# Patient Record
Sex: MALE | Race: WHITE | Employment: UNEMPLOYED | URBAN - METROPOLITAN AREA
[De-identification: names, ages, dates, MRNs, and addresses within clinical notes are randomized per-mention and may not be internally consistent; named-entity substitution may affect disease eponyms.]

---
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
## 2025-03-17 ENCOUNTER — HOSPITAL ENCOUNTER (EMERGENCY)
Facility: CLINIC | Age: 20
Discharge: HOME | End: 2025-03-17
Attending: NURSE PRACTITIONER | Admitting: EMERGENCY MEDICINE
Payer: COMMERCIAL

## 2025-03-17 ENCOUNTER — APPOINTMENT (OUTPATIENT)
Dept: GENERAL RADIOLOGY | Facility: CLINIC | Age: 20
End: 2025-03-17
Attending: NURSE PRACTITIONER
Payer: COMMERCIAL

## 2025-03-17 VITALS
DIASTOLIC BLOOD PRESSURE: 68 MMHG | WEIGHT: 207 LBS | TEMPERATURE: 98.4 F | SYSTOLIC BLOOD PRESSURE: 129 MMHG | HEART RATE: 66 BPM | HEIGHT: 73 IN | OXYGEN SATURATION: 98 % | BODY MASS INDEX: 27.43 KG/M2 | RESPIRATION RATE: 11 BRPM

## 2025-03-17 DIAGNOSIS — K59.00 CONSTIPATION, UNSPECIFIED CONSTIPATION TYPE: ICD-10-CM

## 2025-03-17 DIAGNOSIS — R55 NEAR SYNCOPE: ICD-10-CM

## 2025-03-17 LAB
ALBUMIN SERPL BCG-MCNC: 4.6 G/DL (ref 3.5–5.2)
ALP SERPL-CCNC: 53 U/L (ref 65–260)
ALT SERPL W P-5'-P-CCNC: 16 U/L (ref 0–50)
ANION GAP SERPL CALCULATED.3IONS-SCNC: 10 MMOL/L (ref 7–15)
AST SERPL W P-5'-P-CCNC: 23 U/L (ref 0–35)
BASOPHILS # BLD AUTO: 0 10E3/UL (ref 0–0.2)
BASOPHILS NFR BLD AUTO: 0 %
BILIRUB SERPL-MCNC: 0.5 MG/DL
BUN SERPL-MCNC: 10.1 MG/DL (ref 6–20)
CALCIUM SERPL-MCNC: 9.7 MG/DL (ref 8.8–10.4)
CHLORIDE SERPL-SCNC: 102 MMOL/L (ref 98–107)
CREAT SERPL-MCNC: 0.97 MG/DL (ref 0.67–1.17)
EGFRCR SERPLBLD CKD-EPI 2021: >90 ML/MIN/1.73M2
EOSINOPHIL # BLD AUTO: 0.1 10E3/UL (ref 0–0.7)
EOSINOPHIL NFR BLD AUTO: 2 %
ERYTHROCYTE [DISTWIDTH] IN BLOOD BY AUTOMATED COUNT: 13.1 % (ref 10–15)
GLUCOSE BLDC GLUCOMTR-MCNC: 82 MG/DL (ref 70–99)
GLUCOSE SERPL-MCNC: 88 MG/DL (ref 70–99)
HCO3 SERPL-SCNC: 27 MMOL/L (ref 22–29)
HCT VFR BLD AUTO: 45.8 % (ref 40–53)
HGB BLD-MCNC: 15.4 G/DL (ref 13.3–17.7)
HOLD SPECIMEN: NORMAL
IMM GRANULOCYTES # BLD: 0 10E3/UL
IMM GRANULOCYTES NFR BLD: 0 %
LYMPHOCYTES # BLD AUTO: 3.4 10E3/UL (ref 0.8–5.3)
LYMPHOCYTES NFR BLD AUTO: 41 %
MCH RBC QN AUTO: 29.9 PG (ref 26.5–33)
MCHC RBC AUTO-ENTMCNC: 33.6 G/DL (ref 31.5–36.5)
MCV RBC AUTO: 89 FL (ref 78–100)
MONOCYTES # BLD AUTO: 0.6 10E3/UL (ref 0–1.3)
MONOCYTES NFR BLD AUTO: 8 %
NEUTROPHILS # BLD AUTO: 4.1 10E3/UL (ref 1.6–8.3)
NEUTROPHILS NFR BLD AUTO: 49 %
NRBC # BLD AUTO: 0 10E3/UL
NRBC BLD AUTO-RTO: 0 /100
PLAT MORPH BLD: ABNORMAL
PLATELET # BLD AUTO: 270 10E3/UL (ref 150–450)
POTASSIUM SERPL-SCNC: 4 MMOL/L (ref 3.4–5.3)
PROT SERPL-MCNC: 8.3 G/DL (ref 6.4–8.3)
RBC # BLD AUTO: 5.15 10E6/UL (ref 4.4–5.9)
RBC MORPH BLD: ABNORMAL
SODIUM SERPL-SCNC: 139 MMOL/L (ref 135–145)
VARIANT LYMPHS BLD QL SMEAR: PRESENT
WBC # BLD AUTO: 8.3 10E3/UL (ref 4–11)

## 2025-03-17 PROCEDURE — 93005 ELECTROCARDIOGRAM TRACING: CPT | Performed by: EMERGENCY MEDICINE

## 2025-03-17 PROCEDURE — 82962 GLUCOSE BLOOD TEST: CPT

## 2025-03-17 PROCEDURE — 74019 RADEX ABDOMEN 2 VIEWS: CPT

## 2025-03-17 PROCEDURE — 80053 COMPREHEN METABOLIC PANEL: CPT | Performed by: EMERGENCY MEDICINE

## 2025-03-17 PROCEDURE — 82565 ASSAY OF CREATININE: CPT | Performed by: EMERGENCY MEDICINE

## 2025-03-17 PROCEDURE — 99284 EMERGENCY DEPT VISIT MOD MDM: CPT | Performed by: EMERGENCY MEDICINE

## 2025-03-17 PROCEDURE — 85004 AUTOMATED DIFF WBC COUNT: CPT | Performed by: EMERGENCY MEDICINE

## 2025-03-17 PROCEDURE — 99285 EMERGENCY DEPT VISIT HI MDM: CPT | Performed by: EMERGENCY MEDICINE

## 2025-03-17 PROCEDURE — 36415 COLL VENOUS BLD VENIPUNCTURE: CPT | Performed by: EMERGENCY MEDICINE

## 2025-03-17 PROCEDURE — 85041 AUTOMATED RBC COUNT: CPT | Performed by: EMERGENCY MEDICINE

## 2025-03-17 PROCEDURE — 84155 ASSAY OF PROTEIN SERUM: CPT | Performed by: EMERGENCY MEDICINE

## 2025-03-17 RX ORDER — POLYETHYLENE GLYCOL 3350 17 G/17G
1 POWDER, FOR SOLUTION ORAL DAILY
Qty: 578 G | Refills: 3 | Status: SHIPPED | OUTPATIENT
Start: 2025-03-17

## 2025-03-17 RX ORDER — MAGNESIUM CARB/ALUMINUM HYDROX 105-160MG
296 TABLET,CHEWABLE ORAL ONCE
Qty: 296 ML | Refills: 0 | Status: SHIPPED | OUTPATIENT
Start: 2025-03-17 | End: 2025-03-17

## 2025-03-17 ASSESSMENT — COLUMBIA-SUICIDE SEVERITY RATING SCALE - C-SSRS
6. HAVE YOU EVER DONE ANYTHING, STARTED TO DO ANYTHING, OR PREPARED TO DO ANYTHING TO END YOUR LIFE?: NO
1. IN THE PAST MONTH, HAVE YOU WISHED YOU WERE DEAD OR WISHED YOU COULD GO TO SLEEP AND NOT WAKE UP?: NO
2. HAVE YOU ACTUALLY HAD ANY THOUGHTS OF KILLING YOURSELF IN THE PAST MONTH?: NO

## 2025-03-17 ASSESSMENT — ACTIVITIES OF DAILY LIVING (ADL)
ADLS_ACUITY_SCORE: 44
ADLS_ACUITY_SCORE: 44

## 2025-03-17 NOTE — ED PROVIDER NOTES
History     Chief Complaint   Patient presents with    Constipation     HPI  Orion Deluna is a 19 year old male who presents for concerns of constipation.  The patient reports that over the past 2 months he has been increasingly constipated, he has been dealing with these types of symptoms off and on most of his life but this is gotten worse since her recent change in his job.  He feels bloated and says he has a bowel movement every other day or so, sometimes less often, describes it is very small amounts of rapid turn like pellets.  More recently he has had some soft stringy stool been very small amounts.  He has to strain to push it out.  No significant abdominal pain.  No rectal pain.  He did try MiraLAX 1 day and this did seem to help for that day but then he stopped taking it and he seems like he is worse again.  No fevers, vomiting, or nausea.    The patient initially went to urgent care and was being evaluated when he suddenly felt lightheaded and diaphoretic.  He says he developed ringing in his ears with some mild nausea and felt lightheaded and like everything was going dark.  This all then resolved fairly quickly and he was sent here for further evaluation.    Allergies:  No Known Allergies    Problem List:    Patient Active Problem List    Diagnosis Date Noted    Diabetes mellitus, type 2 (H) 10/31/2022     Priority: Medium    Fecal impaction (H) 02/21/2017     Priority: Medium    Sensorineural hearing loss, bilateral 03/18/2014     Priority: Medium     389.18        Obesity 08/31/2011     Priority: Medium        Past Medical History:    Past Medical History:   Diagnosis Date    Obesity 8/31/2011       Past Surgical History:    No past surgical history on file.    Family History:    Family History   Problem Relation Age of Onset    Obesity Mother     Thyroid Disease Maternal Grandfather     Diabetes Maternal Grandfather     Obesity Maternal Grandfather        Social History:  Marital Status:   "Single [1]  Social History     Tobacco Use    Smoking status: Passive Smoke Exposure - Never Smoker    Smokeless tobacco: Never    Tobacco comments:     in the house   Substance Use Topics    Alcohol use: No    Drug use: No        Medications:    ACCU-CHEK GUIDE test strip  Alcohol Swabs PADS  blood glucose monitoring (SOFTCLIX) lancets  Glucagon (GVOKE HYPOPEN 2-PACK) 1 MG/0.2ML SOAJ  glucose (BD GLUCOSE) 4 g chewable tablet  insulin aspart (NOVOLOG PEN) 100 UNIT/ML pen  insulin glargine (LANTUS PEN) 100 UNIT/ML pen  insulin pen needle (32G X 4 MM) 32G X 4 MM miscellaneous  magnesium citrate 1.745 GM/30ML solution  polyethylene glycol (MIRALAX) 17 GM/Dose powder  Sharps Container MISC          Review of Systems    Physical Exam   BP: 137/89  Pulse: 113  Temp: 98.4  F (36.9  C)  Resp: 18  Height: 185.4 cm (6' 1\")  Weight: 93.9 kg (207 lb)  SpO2: 97 %      Physical Exam  Constitutional:       General: He is not in acute distress.     Appearance: He is well-developed.   HENT:      Head: Normocephalic and atraumatic.   Cardiovascular:      Rate and Rhythm: Normal rate.      Heart sounds: No murmur heard.  Pulmonary:      Effort: No respiratory distress.      Breath sounds: No stridor.   Abdominal:      General: There is no distension.      Palpations: Abdomen is soft.      Tenderness: There is no abdominal tenderness. There is no guarding or rebound.   Skin:     General: Skin is warm and dry.   Neurological:      Mental Status: He is alert.         ED Course        Procedures              EKG Interpretation:      Interpreted by Ben Álvarez MD  Time reviewed: 1620  Symptoms at time of EKG: near syncope   Rhythm: normal sinus   Rate: normal  Axis: normal  Ectopy: none  Conduction: normal  ST Segments/ T Waves: No ST-T wave changes  Q Waves: none  Comparison to prior: No old EKG available    Clinical Impression: normal EKG      Critical Care time:  none     None         Results for orders placed or performed " during the hospital encounter of 03/17/25 (from the past 24 hours)   Abdomen, flat/upright (2 views)    Narrative    EXAM: XR ABDOMEN 2 VIEWS  LOCATION: [facility]  DATE: 3/17/2025    INDICATION: Constipation with difficulties having stool  COMPARISON: Abdominal radiograph 2005.      Impression    IMPRESSION:     No evidence of bowel obstruction. No pneumatosis or free intraperitoneal air.    Moderate amount of stool within the ascending colon. Small amount of stool scattered throughout the remainder of the colon.    Visualized lung bases are clear.   Glucose by meter   Result Value Ref Range    GLUCOSE BY METER POCT 82 70 - 99 mg/dL   [place] Draw    Narrative    The following orders were created for panel order [place] Draw.  Procedure                               Abnormality         Status                     ---------                               -----------         ------                     Extra Blue Top Tube[[id number]]                             Final result               Extra Red Top Tube[[id number]]                              Final result               Extra Green Top (Lithiu...[[id number]]                      Final result               Extra Purple Top Tube[[id number]]                           Final result                 Please view results for these tests on the individual orders.   Extra Blue Top Tube   Result Value Ref Range    Hold Specimen JIC    Extra Red Top Tube   Result Value Ref Range    Hold Specimen JIC    Extra Green Top (Lithium Heparin) Tube   Result Value Ref Range    Hold Specimen     Extra Purple Top Tube   Result Value Ref Range    Hold Specimen JIC    CBC with platelets, differential    Narrative    The following orders were created for panel order CBC with platelets, differential.  Procedure                               Abnormality         Status                     ---------                               -----------         ------                      CBC with platelets and ...[[id number]]                      Final result               RBC and Platelet Morpho...[[id number]]  Abnormal            Final result                 Please view results for these tests on the individual orders.   Comprehensive metabolic panel   Result Value Ref Range    Sodium 139 135 - 145 mmol/L    Potassium 4.0 3.4 - 5.3 mmol/L    Carbon Dioxide (CO2) 27 22 - 29 mmol/L    Anion Gap 10 7 - 15 mmol/L    Urea Nitrogen 10.1 6.0 - 20.0 mg/dL    Creatinine 0.97 0.67 - 1.17 mg/dL    GFR Estimate >90 >60 mL/min/1.73m2    Calcium 9.7 8.8 - 10.4 mg/dL    Chloride 102 98 - 107 mmol/L    Glucose 88 70 - 99 mg/dL    Alkaline Phosphatase 53 (L) 65 - 260 U/L    AST 23 0 - 35 U/L    ALT 16 0 - 50 U/L    Protein Total 8.3 6.4 - 8.3 g/dL    Albumin 4.6 3.5 - 5.2 g/dL    Bilirubin Total 0.5 <=1.2 mg/dL   CBC with platelets and differential   Result Value Ref Range    WBC Count 8.3 4.0 - 11.0 10e3/uL    RBC Count 5.15 4.40 - 5.90 10e6/uL    Hemoglobin 15.4 13.3 - 17.7 g/dL    Hematocrit 45.8 40.0 - 53.0 %    MCV 89 78 - 100 fL    MCH 29.9 26.5 - 33.0 pg    MCHC 33.6 31.5 - 36.5 g/dL    RDW 13.1 10.0 - 15.0 %    Platelet Count 270 150 - 450 10e3/uL    % Neutrophils 49 %    % Lymphocytes 41 %    % Monocytes 8 %    % Eosinophils 2 %    % Basophils 0 %    % Immature Granulocytes 0 %    NRBCs per 100 WBC 0 <1 /100    Absolute Neutrophils 4.1 1.6 - 8.3 10e3/uL    Absolute Lymphocytes 3.4 0.8 - 5.3 10e3/uL    Absolute Monocytes 0.6 0.0 - 1.3 10e3/uL    Absolute Eosinophils 0.1 0.0 - 0.7 10e3/uL    Absolute Basophils 0.0 0.0 - 0.2 10e3/uL    Absolute Immature Granulocytes 0.0 <=0.4 10e3/uL    Absolute NRBCs 0.0 10e3/uL   RBC and Platelet Morphology   Result Value Ref Range    RBC Morphology Confirmed RBC Indices     Platelet Assessment  Automated Count Confirmed. Platelet morphology is normal.     Automated Count Confirmed. Platelet morphology is normal.    Reactive Lymphocytes Present (A) None Seen    EKG 12-lead, tracing only   Result Value Ref Range    Systolic Blood Pressure  mmHg    Diastolic Blood Pressure  mmHg    Ventricular Rate 70 BPM    Atrial Rate 70 BPM    AK Interval 146 ms    QRS Duration 88 ms     ms    QTc 403 ms    P Axis 58 degrees    R AXIS 86 degrees    T Axis 43 degrees    Interpretation ECG       Sinus rhythm  Normal ECG  No previous ECGs available         Medications - No data to display    Assessments & Plan (with Medical Decision Making)   19-year-old male who presents for abdominal pain, was seen initially in urgent care and sent here after he had what sounds like a near syncopal episode while being evaluated.  He denies any complaints currently except for the bloating of his abdomen.  Abdominal exam is benign and not concerning for an acute surgical process such as obstruction.  He did have an x-ray of his abdomen obtained while he was over in urgent care, IV reviewed these images and interpreted them myself, no signs of free air or obstruction.  His glucose is normal.  EKG is sinus rhythm without signs of ischemia or dysrhythmia such as WPW, Brugada syndrome, prolonged QT syndrome, or arrhythmogenic right ventricular dysplasia.  No indication for further workup of his near syncopal episode, probable vasovagal episode.  He is safe to discharge with reassurance and instructions to drink plenty of fluids, increase the fiber in his diet, use a bottle of magnesium citrate tonight and then take MiraLAX daily to help have a normal bowel movement daily.  He is told to return if worse, otherwise follow-up in clinic.  The patient is in agreement with this plan.    I have reviewed the nursing notes.    I have reviewed the findings, diagnosis, plan and need for follow up with the patient.           Medical Decision Making  The patient's presentation was of low complexity (an acute and uncomplicated illness or injury).    The patient's evaluation involved:  ordering and/or review of 3+  test(s) in this encounter (see separate area of note for details)  independent interpretation of testing performed by another health professional (see separate area of note for details)    The patient's management necessitated only low risk treatment.        Discharge Medication List as of 3/17/2025  5:06 PM        START taking these medications    Details   magnesium citrate 1.745 GM/30ML solution Take 296 mLs by mouth once for 1 dose., Disp-296 mL, R-0, E-Prescribe      polyethylene glycol (MIRALAX) 17 GM/Dose powder Take 17 g (1 Capful) by mouth daily., Disp-578 g, R-3, E-Prescribe             Final diagnoses:   Constipation, unspecified constipation type   Near syncope       3/17/2025   [facility] EMERGENCY DEPT       Ben Álvarez MD  03/17/25 2518

## 2025-03-17 NOTE — ED TRIAGE NOTES
Patient was being seen in , during exam, provider was palpating his abdomen. Patient became dizzy, diaphoretic and blurred vision. Patient BS 81. Alert and oriented upon arrival from  to ED.      Triage Assessment (Adult)       Row Name 03/17/25 5844          Triage Assessment    Airway WDL WDL        Respiratory WDL    Respiratory WDL WDL        Skin Circulation/Temperature WDL    Skin Circulation/Temperature WDL X;temperature;circulation     Skin Circulation pallor     Skin Temperature warm        Cardiac WDL    Cardiac WDL WDL        Peripheral/Neurovascular WDL    Peripheral Neurovascular WDL WDL        Cognitive/Neuro/Behavioral WDL    Cognitive/Neuro/Behavioral WDL WDL

## 2025-03-17 NOTE — DISCHARGE INSTRUCTIONS
Drink plenty of water.  Make sure that you are eating plenty of fiber in your diet with foods such as leafy greens (broccoli, spinach, kale) and whole grains instead of white bread. Drink the bottle of magnesium citrate tonight.    Take the Miralax powder to help make your stools softer.  Start with 1-2 capful mixed in 6-8 oz of any liquid once a day.  If that does not help him have softer stools, try increasing it to 3-4 capful in 12-16 oz once a day or 1-2 capful in 6-8 oz twice a day.  You can increase or decrease the amount as needed to achieve one to two soft stools per day.

## 2025-03-17 NOTE — ED PROVIDER NOTES
ED Provider Note  [facility]      History   No chief complaint on file.    HPI  Orion Deluna is a 19 year old male who was brought to urgent care for evaluation of constipation patient reports that he does not have abdominal pain, that he has not had melena or dark-colored stools no obvious blood in his stool.  Denies any distention of his abdomen.  Able to have a stool this morning.  Past medical history of type 2 diabetes, obesity and admission for type 2 diabetes in 2022.  Patient reports that he has changed his lifestyle,  his diet is improved and he lost a significant amount of weight.  Has not been seen primary care for more than 2 years.  Previously was taking insulin and followed by an endocrinologist.  Last notes seen 2022.    After returning from x-ray, reports feeling mildly nauseated, reports pain directly under his ribs on both sides, new onset of decreased visual acuity, and reported feeling lightheaded, with noticeable diaphoresis.  Blood sugar taken in urgent care results 82mg/dL.  Patient was transferred to the emergency department for further evaluation and treatment.            Allergies:  No Known Allergies    Problem List:    Patient Active Problem List    Diagnosis Date Noted    Diabetes mellitus, type 2 (H) 10/31/2022     Priority: Medium    Fecal impaction (H) 02/21/2017     Priority: Medium    Sensorineural hearing loss, bilateral 03/18/2014     Priority: Medium     389.18        Obesity 08/31/2011     Priority: Medium        Past Medical History:    Past Medical History:   Diagnosis Date    Obesity 8/31/2011       Past Surgical History:    No past surgical history on file.    Social History:  Marital Status:  Single [1]  Social History     Tobacco Use    Smoking status: Passive Smoke Exposure - Never Smoker    Smokeless tobacco: Never    Tobacco comments:     in the house   Substance Use Topics    Alcohol use: No    Drug use: No        Medications:    Reports  "not taking any medications at this time.        Review of Systems  A medically appropriate review of systems was performed with pertinent positives and negatives noted in the HPI, and all other systems negative.    Physical Exam   Patient Vitals for the past 24 hrs:   BP Temp Pulse Resp SpO2   03/17/25 1534 137/89 98.4  F (36.9  C) 113 18 97 %          Physical Exam  General: alert and in no acute distress on arrival  Head: atraumatic, normocephalic  Lungs:  nonlabored, CTA bilateral throughout  CV: Regular rate and rhythm on initial exam  Serial exam: patient is tachycardic extremities warm and perfused  Abd: nondistended, soft, nontender on initial exam.  Serial exam: \"Pressure\" reported across entire upper abdomen, bowel sounds present all quadrants.  Skin: no rashes,  diaphoresis and skin color normal on initial exam, serial exam: Patient pale, diaphoretic  Neuro: Patient awake, alert, speech is fluent, no focal deficits.  Psychiatric: affect/mood normal, appropriate historian      ED Course                 Procedures                    Results for orders placed or performed during the hospital encounter of 03/17/25 (from the past 48 hours)   Abdomen, flat/upright (2 views)    Narrative    EXAM: XR ABDOMEN 2 VIEWS  LOCATION: [facility]  DATE: 3/17/2025    INDICATION: Constipation with difficulties having stool  COMPARISON: Abdominal radiograph 2005.      Impression    IMPRESSION:     No evidence of bowel obstruction. No pneumatosis or free intraperitoneal air.    Moderate amount of stool within the ascending colon. Small amount of stool scattered throughout the remainder of the colon.    Visualized lung bases are clear.   Glucose by meter   Result Value Ref Range    GLUCOSE BY METER POCT 82 70 - 99 mg/dL   [place] Draw    Narrative    The following orders were created for panel order [place] Draw.  Procedure                               Abnormality         Status                "      ---------                               -----------         ------                     Extra Blue Top Tube[[id number]]                             In process                 Extra Red Top Tube[[id number]]                              In process                 Extra Green Top (Lithiu...[[id number]]                      In process                 Extra Purple Top Tube[[id number]]                           In process                   Please view results for these tests on the individual orders.       MEDICATIONS GIVEN IN THE EMERGENCY DEPARTMENT:  Medications - No data to display             Assessments & Plan (with Medical Decision Making)  19 year old male who presents to the Urgent Care for evaluation of reported constipation, last stool reported this morning.  On initial exam has soft nontender abdomen bowel sounds present in all quadrants.  On serial exam has pressure reported under both ribs and entire upper abdomen.  Patient had x-ray of his abdomen and upon returning from x-ray reported new symptoms of being lightheaded, visual acuity loss, and visible diaphoresis.  Blood sugar obtained in urgent care which was 82, patient's color appeared pale.  Patient was transferred to the emergency department for further evaluation and treatment.      Patient verbalized agreement with and understanding of the rational for the diagnosis and treatment plan.  All questions were answered to best of my ability and the patient's satisfaction. The patient was advised to contact or return to their primary clinic or UC/ED with any questions that may arise after this visit.       I have reviewed the nursing notes.    I have reviewed the findings, diagnosis, plan and need for follow up with the patient.        NEW PRESCRIPTIONS STARTED AT TODAY'S ER VISIT  New Prescriptions    No medications on file       Final diagnoses:   None       3/17/2025   [facility] EMERGENCY DEPT    Disclaimer: This note consists of  symbols derived from keyboarding, dictation, and/or voice recognition software. As a result, there may be errors in the script that have gone undetected.  Please consider this when interpreting information found in the chart.      Rukhsana Dominguez APRN CNP  03/17/25 3096       Rukhsana Dominguez, DARWIN CNP  03/17/25 1702

## 2025-03-20 LAB
ATRIAL RATE - MUSE: 70 BPM
DIASTOLIC BLOOD PRESSURE - MUSE: NORMAL MMHG
INTERPRETATION ECG - MUSE: NORMAL
P AXIS - MUSE: 58 DEGREES
PR INTERVAL - MUSE: 146 MS
QRS DURATION - MUSE: 88 MS
QT - MUSE: 374 MS
QTC - MUSE: 403 MS
R AXIS - MUSE: 86 DEGREES
SYSTOLIC BLOOD PRESSURE - MUSE: NORMAL MMHG
T AXIS - MUSE: 43 DEGREES
VENTRICULAR RATE- MUSE: 70 BPM